# Patient Record
Sex: MALE | Race: WHITE | NOT HISPANIC OR LATINO | Employment: FULL TIME | URBAN - METROPOLITAN AREA
[De-identification: names, ages, dates, MRNs, and addresses within clinical notes are randomized per-mention and may not be internally consistent; named-entity substitution may affect disease eponyms.]

---

## 2017-01-24 ENCOUNTER — HOSPITAL ENCOUNTER (OUTPATIENT)
Dept: LAB | Facility: MEDICAL CENTER | Age: 32
End: 2017-01-24
Attending: FAMILY MEDICINE
Payer: COMMERCIAL

## 2017-01-24 LAB
ALBUMIN SERPL BCP-MCNC: 4.6 G/DL (ref 3.2–4.9)
ALBUMIN/GLOB SERPL: 1.7 G/DL
ALP SERPL-CCNC: 69 U/L (ref 30–99)
ALT SERPL-CCNC: 13 U/L (ref 2–50)
ANION GAP SERPL CALC-SCNC: 8 MMOL/L (ref 0–11.9)
AST SERPL-CCNC: 13 U/L (ref 12–45)
BASOPHILS # BLD AUTO: 0.05 K/UL (ref 0–0.12)
BASOPHILS NFR BLD AUTO: 0.7 % (ref 0–1.8)
BILIRUB SERPL-MCNC: 0.9 MG/DL (ref 0.1–1.5)
BUN SERPL-MCNC: 18 MG/DL (ref 8–22)
CALCIUM SERPL-MCNC: 9.4 MG/DL (ref 8.5–10.5)
CHLORIDE SERPL-SCNC: 103 MMOL/L (ref 96–112)
CHOLEST SERPL-MCNC: 155 MG/DL (ref 100–199)
CO2 SERPL-SCNC: 28 MMOL/L (ref 20–33)
CREAT SERPL-MCNC: 1 MG/DL (ref 0.5–1.4)
EOSINOPHIL # BLD: 0.18 K/UL (ref 0–0.51)
EOSINOPHIL NFR BLD AUTO: 2.7 % (ref 0–6.9)
ERYTHROCYTE [DISTWIDTH] IN BLOOD BY AUTOMATED COUNT: 39.3 FL (ref 35.9–50)
EST. AVERAGE GLUCOSE BLD GHB EST-MCNC: 117 MG/DL
GLOBULIN SER CALC-MCNC: 2.7 G/DL (ref 1.9–3.5)
GLUCOSE SERPL-MCNC: 108 MG/DL (ref 65–99)
HBA1C MFR BLD: 5.7 % (ref 0–5.6)
HCT VFR BLD AUTO: 46.4 % (ref 42–52)
HDLC SERPL-MCNC: 47 MG/DL
HGB BLD-MCNC: 15.5 G/DL (ref 14–18)
IMM GRANULOCYTES # BLD AUTO: 0.02 K/UL (ref 0–0.11)
IMM GRANULOCYTES NFR BLD AUTO: 0.3 % (ref 0–0.9)
LDLC SERPL CALC-MCNC: 85 MG/DL
LYMPHOCYTES # BLD: 2.26 K/UL (ref 1–4.8)
LYMPHOCYTES NFR BLD AUTO: 33.7 % (ref 22–41)
MCH RBC QN AUTO: 29.5 PG (ref 27–33)
MCHC RBC AUTO-ENTMCNC: 33.4 G/DL (ref 33.7–35.3)
MCV RBC AUTO: 88.2 FL (ref 81.4–97.8)
MONOCYTES # BLD: 0.5 K/UL (ref 0–0.85)
MONOCYTES NFR BLD AUTO: 7.5 % (ref 0–13.4)
NEUTROPHILS # BLD: 3.69 K/UL (ref 1.82–7.42)
NEUTROPHILS NFR BLD AUTO: 55.1 % (ref 44–72)
NRBC # BLD AUTO: 0 K/UL
NRBC BLD-RTO: 0 /100 WBC
PLATELET # BLD AUTO: 308 K/UL (ref 164–446)
PMV BLD AUTO: 10 FL (ref 9–12.9)
POTASSIUM SERPL-SCNC: 4 MMOL/L (ref 3.6–5.5)
PROT SERPL-MCNC: 7.3 G/DL (ref 6–8.2)
RBC # BLD AUTO: 5.26 M/UL (ref 4.7–6.1)
SODIUM SERPL-SCNC: 139 MMOL/L (ref 135–145)
TRIGL SERPL-MCNC: 113 MG/DL (ref 0–149)
WBC # BLD AUTO: 6.7 K/UL (ref 4.8–10.8)

## 2017-01-24 PROCEDURE — 80061 LIPID PANEL: CPT

## 2017-01-24 PROCEDURE — 83036 HEMOGLOBIN GLYCOSYLATED A1C: CPT

## 2017-01-24 PROCEDURE — 80053 COMPREHEN METABOLIC PANEL: CPT

## 2017-01-24 PROCEDURE — 85025 COMPLETE CBC W/AUTO DIFF WBC: CPT

## 2017-01-24 PROCEDURE — 36415 COLL VENOUS BLD VENIPUNCTURE: CPT

## 2017-03-27 RX ORDER — DEXTROAMPHETAMINE SACCHARATE, AMPHETAMINE ASPARTATE, DEXTROAMPHETAMINE SULFATE AND AMPHETAMINE SULFATE 2.5; 2.5; 2.5; 2.5 MG/1; MG/1; MG/1; MG/1
10 TABLET ORAL EVERY MORNING
Qty: 30 TAB | Refills: 0 | Status: SHIPPED | OUTPATIENT
Start: 2017-03-27 | End: 2017-07-25 | Stop reason: SDUPTHER

## 2017-03-27 RX ORDER — DEXTROAMPHETAMINE SACCHARATE, AMPHETAMINE ASPARTATE MONOHYDRATE, DEXTROAMPHETAMINE SULFATE AND AMPHETAMINE SULFATE 5; 5; 5; 5 MG/1; MG/1; MG/1; MG/1
20 CAPSULE, EXTENDED RELEASE ORAL EVERY MORNING
Qty: 30 CAP | Refills: 0 | Status: SHIPPED | OUTPATIENT
Start: 2017-03-27 | End: 2017-04-24 | Stop reason: SDUPTHER

## 2017-04-20 ENCOUNTER — APPOINTMENT (OUTPATIENT)
Dept: BEHAVIORAL HEALTH | Facility: PHYSICIAN GROUP | Age: 32
End: 2017-04-20
Payer: COMMERCIAL

## 2017-04-24 ENCOUNTER — OFFICE VISIT (OUTPATIENT)
Dept: BEHAVIORAL HEALTH | Facility: PHYSICIAN GROUP | Age: 32
End: 2017-04-24
Payer: COMMERCIAL

## 2017-04-24 VITALS
WEIGHT: 212 LBS | BODY MASS INDEX: 30.35 KG/M2 | HEART RATE: 90 BPM | DIASTOLIC BLOOD PRESSURE: 90 MMHG | HEIGHT: 70 IN | SYSTOLIC BLOOD PRESSURE: 127 MMHG

## 2017-04-24 DIAGNOSIS — F90.0 ADHD, PREDOMINANTLY INATTENTIVE TYPE: ICD-10-CM

## 2017-04-24 PROCEDURE — 99212 OFFICE O/P EST SF 10 MIN: CPT | Performed by: PSYCHIATRY & NEUROLOGY

## 2017-04-24 RX ORDER — DEXTROAMPHETAMINE SACCHARATE, AMPHETAMINE ASPARTATE MONOHYDRATE, DEXTROAMPHETAMINE SULFATE AND AMPHETAMINE SULFATE 5; 5; 5; 5 MG/1; MG/1; MG/1; MG/1
20 CAPSULE, EXTENDED RELEASE ORAL EVERY MORNING
Qty: 30 CAP | Refills: 0 | Status: SHIPPED | OUTPATIENT
Start: 2017-06-24 | End: 2017-07-25

## 2017-04-24 RX ORDER — DEXTROAMPHETAMINE SACCHARATE, AMPHETAMINE ASPARTATE MONOHYDRATE, DEXTROAMPHETAMINE SULFATE AND AMPHETAMINE SULFATE 5; 5; 5; 5 MG/1; MG/1; MG/1; MG/1
20 CAPSULE, EXTENDED RELEASE ORAL EVERY MORNING
Qty: 30 CAP | Refills: 0 | Status: SHIPPED | OUTPATIENT
Start: 2017-04-26 | End: 2017-07-25

## 2017-04-24 RX ORDER — DEXTROAMPHETAMINE SACCHARATE, AMPHETAMINE ASPARTATE MONOHYDRATE, DEXTROAMPHETAMINE SULFATE AND AMPHETAMINE SULFATE 5; 5; 5; 5 MG/1; MG/1; MG/1; MG/1
20 CAPSULE, EXTENDED RELEASE ORAL EVERY MORNING
Qty: 30 CAP | Refills: 0 | Status: SHIPPED | OUTPATIENT
Start: 2017-05-25 | End: 2017-07-25 | Stop reason: SDUPTHER

## 2017-04-24 NOTE — PROGRESS NOTES
PSYCHIATRY FOLLOW-UP NOTE      Chief Complaint   Patient presents with   • Follow-Up     ADHD         History Of Present Illness:  Stanislaw Delgado is a 31 y.o. old male with ADHD comes in today for follow up, was last seen 4 months ago. He has been doing good since his last visit here. Continues to be compliant with Adderall and is doing good in regards to his ADHD symptoms. Spring and summer are the busiest for him in terms of job and he is been having long days and a lot of traveling. He has noticed that every spring and summer he is using more of the IR Adderall that he uses in the [place]. He likes his current regimen and is not interested in increasing the dose of XL formulation to 30 mg as he is worried it might interfere with his sleep. Denies any side effects with Adderall on his sleep, anxiety, mood or appetite. Denies any recent stressors.    Social History:   Single currently, never , no kids. Parents and sister live in [place]. 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    Substance Use:  Alcohol - Social drinking with friends. Dale binge drinking.   Nicotine - Denies  Illicit drugs - Denies     Past Medication Trials:  None    Medications:  Current Outpatient Prescriptions   Medication Sig Dispense Refill   • Pseudoephedrine-Guaifenesin (MUCINEX D PO) Take  by mouth.     • amphetamine-dextroamphetamine (ADDERALL XR) 20 MG per XR capsule Take 1 Cap by mouth every morning. 30 Cap 0   • amphetamine-dextroamphetamine (ADDERALL, 10MG,) 10 MG Tab Take 1 Tab by mouth every morning. 30 Tab 0     No current facility-administered medications for this visit.       Review Of Systems:    Constitutional - Negative for fatigue  Respiratory - Negative for shortness of breath, cough  CVS - Negative for chest pain, palpitations  GI - Negative for nausea, vomiting, abdominal pain, diarrhea, constipation  Musculoskeletal - Negative for back pain  Neurological - Negative for headaches  Psychiatric -  "Negative for focus problems, depression, anxiety    Physical Examination:  Vital signs: /90 mmHg  Pulse 90  Ht 1.778 m (5' 10\")  Wt 96.163 kg (212 lb)  BMI 30.42 kg/m2    Musculoskeletal: Normal gait. No abnormal movements.     Mental Status Evaluation:   General: Young male, dressed in casual attire, good grooming and hygiene, in no apparent distress, calm and cooperative, good eye contact, no psychomotor agitation or retardation  Orientation: Alert and oriented to person, place and time  Recent and remote memory: Grossly intact  Attention span and concentration: Grossly intact  Speech: Spontaneous, normal rate, rhythm and tone  Thought Process: Linear, logical and goal directed  Thought Content: Denies suicidal or homicidal ideations, intent or plan  Perception: Denies auditory or visual hallucinations. No delusions noted  Associations: Intact  Language: Appropriate  Fund of knowledge and vocabulary: Grossly adequate  Mood: \"Good\"  Affect: Euthymic, mood congruent  Insight: Good  Judgment: Good      Impression:  1. ADHD, inattentive type    Medical Records/Labs/Diagnostic Tests Reviewed:  [facility] records -appropriate refills, no abuse suspected    Plan:  1. Continue Adderall XR 20 mg in the morning for ADHD. Provided 3 scripts for 30 tablets each.  2. Continue Adderall IR 10 mg daily as needed for ADHD. Not refilled today. It was refilled on 3/27/17 but he has not picked up the medication from the pharmacy.    Return to clinic in 3 months or sooner if symptoms worsen    The proposed treatment plan was discussed with the patient who was provided the opportunity to ask questions and make suggestions regarding alternative treatment. Patient verbalized understanding and expressed agreement with the plan.     Xochitl Marquez M.D.  04/24/2017    This note was created using voice recognition software (Dragon). The accuracy of the dictation is limited by the abilities of the software. I have reviewed the note " prior to signing, however some errors in grammar and context are still possible. If you have any questions related to this note please do not hesitate to contact our office.

## 2017-07-25 RX ORDER — DEXTROAMPHETAMINE SACCHARATE, AMPHETAMINE ASPARTATE MONOHYDRATE, DEXTROAMPHETAMINE SULFATE AND AMPHETAMINE SULFATE 5; 5; 5; 5 MG/1; MG/1; MG/1; MG/1
20 CAPSULE, EXTENDED RELEASE ORAL EVERY MORNING
Qty: 30 CAP | Refills: 0 | Status: SHIPPED | OUTPATIENT
Start: 2017-07-25 | End: 2017-08-23 | Stop reason: SDUPTHER

## 2017-07-25 RX ORDER — DEXTROAMPHETAMINE SACCHARATE, AMPHETAMINE ASPARTATE, DEXTROAMPHETAMINE SULFATE AND AMPHETAMINE SULFATE 2.5; 2.5; 2.5; 2.5 MG/1; MG/1; MG/1; MG/1
10 TABLET ORAL
Qty: 30 TAB | Refills: 0 | Status: SHIPPED | OUTPATIENT
Start: 2017-07-25 | End: 2017-08-23 | Stop reason: SDUPTHER

## 2017-08-23 ENCOUNTER — OFFICE VISIT (OUTPATIENT)
Dept: BEHAVIORAL HEALTH | Facility: PHYSICIAN GROUP | Age: 32
End: 2017-08-23
Payer: COMMERCIAL

## 2017-08-23 VITALS
HEIGHT: 70 IN | BODY MASS INDEX: 31.92 KG/M2 | HEART RATE: 92 BPM | DIASTOLIC BLOOD PRESSURE: 88 MMHG | WEIGHT: 223 LBS | SYSTOLIC BLOOD PRESSURE: 130 MMHG

## 2017-08-23 DIAGNOSIS — F90.0 ADHD, PREDOMINANTLY INATTENTIVE TYPE: ICD-10-CM

## 2017-08-23 PROCEDURE — 99213 OFFICE O/P EST LOW 20 MIN: CPT | Performed by: PSYCHIATRY & NEUROLOGY

## 2017-08-23 RX ORDER — DEXTROAMPHETAMINE SACCHARATE, AMPHETAMINE ASPARTATE MONOHYDRATE, DEXTROAMPHETAMINE SULFATE AND AMPHETAMINE SULFATE 5; 5; 5; 5 MG/1; MG/1; MG/1; MG/1
20 CAPSULE, EXTENDED RELEASE ORAL EVERY MORNING
Qty: 30 CAP | Refills: 0 | Status: SHIPPED | OUTPATIENT
Start: 2017-10-20 | End: 2017-12-01 | Stop reason: SDUPTHER

## 2017-08-23 RX ORDER — DEXTROAMPHETAMINE SACCHARATE, AMPHETAMINE ASPARTATE, DEXTROAMPHETAMINE SULFATE AND AMPHETAMINE SULFATE 2.5; 2.5; 2.5; 2.5 MG/1; MG/1; MG/1; MG/1
10 TABLET ORAL
Qty: 30 TAB | Refills: 0 | Status: SHIPPED | OUTPATIENT
Start: 2017-09-22 | End: 2017-12-01 | Stop reason: SDUPTHER

## 2017-08-23 RX ORDER — DEXTROAMPHETAMINE SACCHARATE, AMPHETAMINE ASPARTATE MONOHYDRATE, DEXTROAMPHETAMINE SULFATE AND AMPHETAMINE SULFATE 5; 5; 5; 5 MG/1; MG/1; MG/1; MG/1
20 CAPSULE, EXTENDED RELEASE ORAL EVERY MORNING
Qty: 30 CAP | Refills: 0 | Status: SHIPPED | OUTPATIENT
Start: 2017-08-24 | End: 2017-09-12 | Stop reason: SDUPTHER

## 2017-08-23 RX ORDER — DEXTROAMPHETAMINE SACCHARATE, AMPHETAMINE ASPARTATE, DEXTROAMPHETAMINE SULFATE AND AMPHETAMINE SULFATE 2.5; 2.5; 2.5; 2.5 MG/1; MG/1; MG/1; MG/1
10 TABLET ORAL
Qty: 30 TAB | Refills: 0 | Status: SHIPPED | OUTPATIENT
Start: 2017-08-24 | End: 2017-09-12 | Stop reason: SDUPTHER

## 2017-08-23 RX ORDER — DEXTROAMPHETAMINE SACCHARATE, AMPHETAMINE ASPARTATE MONOHYDRATE, DEXTROAMPHETAMINE SULFATE AND AMPHETAMINE SULFATE 5; 5; 5; 5 MG/1; MG/1; MG/1; MG/1
20 CAPSULE, EXTENDED RELEASE ORAL EVERY MORNING
Qty: 30 CAP | Refills: 0 | Status: SHIPPED | OUTPATIENT
Start: 2017-09-22 | End: 2017-12-01 | Stop reason: SDUPTHER

## 2017-08-23 NOTE — PROGRESS NOTES
PSYCHIATRY FOLLOW-UP NOTE      Chief Complaint   Patient presents with   • Follow-Up     ADHD         History Of Present Illness:  Stanislaw Delgado is a 32 y.o. old male with ADHD comes in today for follow up, was last seen 4 months ago. He reports doing good since his last visit here. He has been really busy the whole summer with his job. He has been traveling a lot and will be doing the same for another 2 months or so. He enjoys his job but has been thinking about moving to [facility] to a bigger school. He continues to be compliant with his Adderall and endorses benefit on his ADHD symptoms. He has been taking more of the IR Adderall because he has been having a lot of longer days with practice and traveling. He denies any side effects that he has noticed from Adderall on his appetite or sleep. Denies any current mood or anxiety symptoms. Denies having problems with irritability or agitation. Denies any recent impulsive or reckless behaviors. Denies any new stressors at this time.    Social History:   Single currently, never , no kids. Parents and sister live in [place]. 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    Substance Use:  Alcohol - Social drinking with friends.  Nicotine - Denies  Illicit drugs - Denies     Past Medication Trials:  None    Medications:  Current Outpatient Prescriptions   Medication Sig Dispense Refill   • [START ON 8/24/2017] amphetamine-dextroamphetamine (ADDERALL XR, 20MG,) 20 MG per XR capsule Take 1 Cap by mouth every morning. 30 Cap 0   • [START ON 9/22/2017] amphetamine-dextroamphetamine (ADDERALL XR) 20 MG per XR capsule Take 1 Cap by mouth every morning. 30 Cap 0   • [START ON 10/20/2017] amphetamine-dextroamphetamine (ADDERALL XR) 20 MG per XR capsule Take 1 Cap by mouth every morning. 30 Cap 0   • [START ON 8/24/2017] amphetamine-dextroamphetamine (ADDERALL, 10MG,) 10 MG Tab Take 1 Tab by mouth 1 time daily as needed (ADHD symptoms). 30 Tab 0   •  "[START ON 9/22/2017] amphetamine-dextroamphetamine (ADDERALL) 10 MG Tab Take 1 Tab by mouth 1 time daily as needed (ADHD symptoms). 30 Tab 0   • Pseudoephedrine-Guaifenesin (MUCINEX D PO) Take  by mouth.       No current facility-administered medications for this visit.       Review Of Systems:    Constitutional - Negative for fatigue  Respiratory - Negative for shortness of breath, cough  CVS - Negative for chest pain, palpitations  GI - Negative for nausea, vomiting, abdominal pain, diarrhea, constipation  Musculoskeletal - Negative for back pain  Neurological - Negative for headaches  Psychiatric - Negative for focus problems, depression, anxiety    Physical Examination:  Vital signs: /88 mmHg  Pulse 92  Ht 1.778 m (5' 10\")  Wt 101.152 kg (223 lb)  BMI 32.00 kg/m2    Musculoskeletal: Normal gait. No abnormal movements.     Mental Status Evaluation:   General: Young male, dressed in casual attire, good grooming and hygiene, in no apparent distress, calm and cooperative, good eye contact, no psychomotor agitation or retardation  Orientation: Alert and oriented to person, place and time  Recent and remote memory: Grossly intact  Attention span and concentration: Grossly intact  Speech: Spontaneous, normal rate, rhythm and tone  Thought Process: Linear, logical and goal directed  Thought Content: Denies suicidal or homicidal ideations, intent or plan  Perception: Denies auditory or visual hallucinations. No delusions noted  Associations: Intact  Language: Appropriate  Fund of knowledge and vocabulary: Grossly adequate  Mood: \"fine\"  Affect: Euthymic, mood congruent  Insight: Good  Judgment: Good      Impression:  1. ADHD, inattentive type    Medical Records/Labs/Diagnostic Tests Reviewed:  [place] [facility] records - appropriate refills, no abuse suspected    Plan:  1. Continue Adderall XR 20 mg in the morning for ADHD. Provided him with 3 prescriptions for 30 tablets each.  2. Continue Adderall IR 10 mg daily as " needed for ADHD. Provided him with 2 prescriptions for 30 tablets each.    Return to clinic in 3 months or sooner if symptoms worsen    Total face-to-face time: 20 minutes  More than 50% of face-to-face time was spent in counseling and coordinating care. Discussed coping skills and decision making.     The proposed treatment plan was discussed with the patient who was provided the opportunity to ask questions and make suggestions regarding alternative treatment. Patient verbalized understanding and expressed agreement with the plan.     Xochitl Marquez M.D.  08/23/2017    This note was created using voice recognition software (Dragon). The accuracy of the dictation is limited by the abilities of the software. I have reviewed the note prior to signing, however some errors in grammar and context are still possible. If you have any questions related to this note please do not hesitate to contact our office.

## 2017-09-12 RX ORDER — DEXTROAMPHETAMINE SACCHARATE, AMPHETAMINE ASPARTATE, DEXTROAMPHETAMINE SULFATE AND AMPHETAMINE SULFATE 2.5; 2.5; 2.5; 2.5 MG/1; MG/1; MG/1; MG/1
10 TABLET ORAL
Qty: 30 TAB | Refills: 0 | Status: SHIPPED | OUTPATIENT
Start: 2017-09-12 | End: 2017-12-01

## 2017-09-12 RX ORDER — DEXTROAMPHETAMINE SACCHARATE, AMPHETAMINE ASPARTATE MONOHYDRATE, DEXTROAMPHETAMINE SULFATE AND AMPHETAMINE SULFATE 5; 5; 5; 5 MG/1; MG/1; MG/1; MG/1
20 CAPSULE, EXTENDED RELEASE ORAL EVERY MORNING
Qty: 30 CAP | Refills: 0 | Status: SHIPPED | OUTPATIENT
Start: 2017-09-12 | End: 2017-12-01 | Stop reason: SDUPTHER

## 2017-09-12 NOTE — TELEPHONE ENCOUNTER
Favian dropped his Adderall XR and IR prescriptions from 2017 that he was unable to fill on time and have  now. I have refilled both of this medications and he will be picking up the new prescriptions from our office at his convenience.

## 2017-10-17 ENCOUNTER — DOCUMENTATION (OUTPATIENT)
Dept: BEHAVIORAL HEALTH | Facility: PHYSICIAN GROUP | Age: 32
End: 2017-10-17

## 2017-12-01 ENCOUNTER — OFFICE VISIT (OUTPATIENT)
Dept: BEHAVIORAL HEALTH | Facility: PHYSICIAN GROUP | Age: 32
End: 2017-12-01
Payer: COMMERCIAL

## 2017-12-01 VITALS
SYSTOLIC BLOOD PRESSURE: 132 MMHG | BODY MASS INDEX: 32.35 KG/M2 | DIASTOLIC BLOOD PRESSURE: 99 MMHG | WEIGHT: 226 LBS | HEART RATE: 95 BPM | HEIGHT: 70 IN

## 2017-12-01 DIAGNOSIS — F90.0 ADHD, PREDOMINANTLY INATTENTIVE TYPE: ICD-10-CM

## 2017-12-01 PROCEDURE — 99213 OFFICE O/P EST LOW 20 MIN: CPT | Performed by: PSYCHIATRY & NEUROLOGY

## 2017-12-01 RX ORDER — DEXTROAMPHETAMINE SACCHARATE, AMPHETAMINE ASPARTATE MONOHYDRATE, DEXTROAMPHETAMINE SULFATE AND AMPHETAMINE SULFATE 5; 5; 5; 5 MG/1; MG/1; MG/1; MG/1
20 CAPSULE, EXTENDED RELEASE ORAL EVERY MORNING
Qty: 30 CAP | Refills: 0 | Status: SHIPPED | OUTPATIENT
Start: 2017-12-30 | End: 2018-03-07

## 2017-12-01 RX ORDER — DEXTROAMPHETAMINE SACCHARATE, AMPHETAMINE ASPARTATE MONOHYDRATE, DEXTROAMPHETAMINE SULFATE AND AMPHETAMINE SULFATE 5; 5; 5; 5 MG/1; MG/1; MG/1; MG/1
20 CAPSULE, EXTENDED RELEASE ORAL EVERY MORNING
Qty: 30 CAP | Refills: 0 | Status: SHIPPED | OUTPATIENT
Start: 2017-12-01 | End: 2018-03-07 | Stop reason: SDUPTHER

## 2017-12-01 RX ORDER — DEXTROAMPHETAMINE SACCHARATE, AMPHETAMINE ASPARTATE MONOHYDRATE, DEXTROAMPHETAMINE SULFATE AND AMPHETAMINE SULFATE 5; 5; 5; 5 MG/1; MG/1; MG/1; MG/1
20 CAPSULE, EXTENDED RELEASE ORAL EVERY MORNING
Qty: 30 CAP | Refills: 0 | Status: SHIPPED | OUTPATIENT
Start: 2018-01-28 | End: 2018-03-07

## 2017-12-01 RX ORDER — DEXTROAMPHETAMINE SACCHARATE, AMPHETAMINE ASPARTATE, DEXTROAMPHETAMINE SULFATE AND AMPHETAMINE SULFATE 2.5; 2.5; 2.5; 2.5 MG/1; MG/1; MG/1; MG/1
10 TABLET ORAL
Qty: 30 TAB | Refills: 0 | Status: SHIPPED | OUTPATIENT
Start: 2017-12-01 | End: 2018-03-07 | Stop reason: SDUPTHER

## 2017-12-01 NOTE — PROGRESS NOTES
PSYCHIATRY FOLLOW-UP NOTE      Chief Complaint   Patient presents with   • Follow-Up     ADHD         History Of Present Illness:  Stanislaw Delgado is a 32 y.o. old male with ADHD comes in today for follow up, was last seen 3 months ago. He has been doing good since his last visit here. He ran out of his Adderall pills a week and a half ago and has been struggling with energy and lack of focus since then. He endorses benefit from the extended release Adderall when he is on it. He usually takes a major release Adderall only if he has something going on in the evening that requires his concentration. Denies any side effects from Adderall that he has noticed. Denies any mood or anxiety symptoms. Denies any impairments in his sleep or appetite. Denies any new psychosocial stressors.    Social History:   Single currently, never , no kids. Parents and sister live in [place]. 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    Substance Use:  Alcohol - Social drinking with friends.  Nicotine - Denies  Illicit drugs - Denies     Past Medication Trials:  None    Medications:  Current Outpatient Prescriptions   Medication Sig Dispense Refill   • amphetamine-dextroamphetamine (ADDERALL XR) 20 MG per XR capsule Take 1 Cap by mouth every morning. 30 Cap 0   • [START ON 12/30/2017] amphetamine-dextroamphetamine (ADDERALL XR) 20 MG per XR capsule Take 1 Cap by mouth every morning. 30 Cap 0   • [START ON 1/28/2018] amphetamine-dextroamphetamine (ADDERALL XR, 20MG,) 20 MG per XR capsule Take 1 Cap by mouth every morning. 30 Cap 0   • amphetamine-dextroamphetamine (ADDERALL) 10 MG Tab Take 1 Tab by mouth 1 time daily as needed (ADHD symptoms). 30 Tab 0     No current facility-administered medications for this visit.        Review Of Systems:    Constitutional - Negative for fatigue  Respiratory - Negative for shortness of breath, cough  CVS - Negative for chest pain, palpitations  GI - Negative for nausea, vomiting,  "abdominal pain, diarrhea, constipation  Musculoskeletal - Negative for back pain  Neurological - Negative for headaches  Psychiatric - Negative for focus problems, depression, anxiety    Physical Examination:  Vital signs: /99   Pulse 95   Ht 1.778 m (5' 10\")   Wt 102.5 kg (226 lb)   BMI 32.43 kg/m²     Musculoskeletal: Normal gait. No abnormal movements.     Mental Status Evaluation:   General: Young male, dressed in casual attire, good grooming and hygiene, in no apparent distress, calm and cooperative, good eye contact, no psychomotor agitation or retardation  Orientation: Alert and oriented to person, place and time  Recent and remote memory: Grossly intact  Attention span and concentration: Grossly intact  Speech: Spontaneous, normal rate, rhythm and tone  Thought Process: Linear, logical and goal directed  Thought Content: Denies suicidal or homicidal ideations, intent or plan  Perception: Denies auditory or visual hallucinations. No delusions noted  Associations: Intact  Language: Appropriate  Fund of knowledge and vocabulary: Grossly adequate  Mood: \"good\"  Affect: Euthymic, mood congruent  Insight: Good  Judgment: Good      Impression:  1. ADHD, inattentive type    Medical Records/Labs/Diagnostic Tests Reviewed:  [facility] records - appropriate refills, no abuse suspected    Plan:  1. Continue Adderall XR 20 mg in the morning for ADHD. Provided him with 3 prescriptions for 30 tablets each.  2. Continue Adderall IR 10 mg daily as needed for ADHD. Provided him with 1 prescription for 30 tablets each.    Return to clinic in 3 months or sooner if symptoms worsen    The proposed treatment plan was discussed with the patient who was provided the opportunity to ask questions and make suggestions regarding alternative treatment. Patient verbalized understanding and expressed agreement with the plan.     Xochitl Marquez M.D.  12/01/17    This note was created using voice recognition software (Dragon). The " accuracy of the dictation is limited by the abilities of the software. I have reviewed the note prior to signing, however some errors in grammar and context are still possible. If you have any questions related to this note please do not hesitate to contact our office.

## 2018-03-01 ENCOUNTER — APPOINTMENT (OUTPATIENT)
Dept: BEHAVIORAL HEALTH | Facility: PHYSICIAN GROUP | Age: 33
End: 2018-03-01
Payer: COMMERCIAL

## 2018-03-07 DIAGNOSIS — F90.0 ADHD, PREDOMINANTLY INATTENTIVE TYPE: ICD-10-CM

## 2018-03-07 RX ORDER — DEXTROAMPHETAMINE SACCHARATE, AMPHETAMINE ASPARTATE MONOHYDRATE, DEXTROAMPHETAMINE SULFATE AND AMPHETAMINE SULFATE 5; 5; 5; 5 MG/1; MG/1; MG/1; MG/1
20 CAPSULE, EXTENDED RELEASE ORAL EVERY MORNING
Qty: 30 CAP | Refills: 0 | Status: SHIPPED | OUTPATIENT
Start: 2018-03-07 | End: 2018-04-06

## 2018-03-07 RX ORDER — DEXTROAMPHETAMINE SACCHARATE, AMPHETAMINE ASPARTATE, DEXTROAMPHETAMINE SULFATE AND AMPHETAMINE SULFATE 2.5; 2.5; 2.5; 2.5 MG/1; MG/1; MG/1; MG/1
10 TABLET ORAL
Qty: 30 TAB | Refills: 0 | Status: SHIPPED | OUTPATIENT
Start: 2018-03-07 | End: 2018-04-06

## 2018-04-10 ENCOUNTER — OFFICE VISIT (OUTPATIENT)
Dept: BEHAVIORAL HEALTH | Facility: PHYSICIAN GROUP | Age: 33
End: 2018-04-10
Payer: COMMERCIAL

## 2018-04-10 VITALS
BODY MASS INDEX: 30.21 KG/M2 | HEIGHT: 70 IN | WEIGHT: 211 LBS | DIASTOLIC BLOOD PRESSURE: 81 MMHG | SYSTOLIC BLOOD PRESSURE: 131 MMHG | HEART RATE: 78 BPM

## 2018-04-10 DIAGNOSIS — F90.0 ADHD, PREDOMINANTLY INATTENTIVE TYPE: Primary | ICD-10-CM

## 2018-04-10 PROCEDURE — 99213 OFFICE O/P EST LOW 20 MIN: CPT | Performed by: PSYCHIATRY & NEUROLOGY

## 2018-04-10 RX ORDER — DEXTROAMPHETAMINE SACCHARATE, AMPHETAMINE ASPARTATE MONOHYDRATE, DEXTROAMPHETAMINE SULFATE AND AMPHETAMINE SULFATE 5; 5; 5; 5 MG/1; MG/1; MG/1; MG/1
20 CAPSULE, EXTENDED RELEASE ORAL EVERY MORNING
Qty: 30 CAP | Refills: 0 | Status: SHIPPED | OUTPATIENT
Start: 2018-05-09 | End: 2018-06-08

## 2018-04-10 RX ORDER — DEXTROAMPHETAMINE SACCHARATE, AMPHETAMINE ASPARTATE, DEXTROAMPHETAMINE SULFATE AND AMPHETAMINE SULFATE 2.5; 2.5; 2.5; 2.5 MG/1; MG/1; MG/1; MG/1
5 TABLET ORAL
Qty: 30 TAB | Refills: 0 | Status: SHIPPED | OUTPATIENT
Start: 2018-05-09 | End: 2018-04-10 | Stop reason: SDUPTHER

## 2018-04-10 RX ORDER — DEXTROAMPHETAMINE SACCHARATE, AMPHETAMINE ASPARTATE MONOHYDRATE, DEXTROAMPHETAMINE SULFATE AND AMPHETAMINE SULFATE 5; 5; 5; 5 MG/1; MG/1; MG/1; MG/1
20 CAPSULE, EXTENDED RELEASE ORAL EVERY MORNING
Qty: 30 CAP | Refills: 0 | Status: SHIPPED | OUTPATIENT
Start: 2018-06-07 | End: 2018-06-28 | Stop reason: SDUPTHER

## 2018-04-10 RX ORDER — DEXTROAMPHETAMINE SACCHARATE, AMPHETAMINE ASPARTATE, DEXTROAMPHETAMINE SULFATE AND AMPHETAMINE SULFATE 2.5; 2.5; 2.5; 2.5 MG/1; MG/1; MG/1; MG/1
10 TABLET ORAL
Qty: 30 TAB | Refills: 0 | Status: SHIPPED | OUTPATIENT
Start: 2018-05-09 | End: 2018-06-08

## 2018-04-10 RX ORDER — DEXTROAMPHETAMINE SACCHARATE, AMPHETAMINE ASPARTATE, DEXTROAMPHETAMINE SULFATE AND AMPHETAMINE SULFATE 2.5; 2.5; 2.5; 2.5 MG/1; MG/1; MG/1; MG/1
10 TABLET ORAL
Qty: 30 TAB | Refills: 0 | Status: SHIPPED | OUTPATIENT
Start: 2018-04-10 | End: 2018-05-10

## 2018-04-10 RX ORDER — DEXTROAMPHETAMINE SACCHARATE, AMPHETAMINE ASPARTATE MONOHYDRATE, DEXTROAMPHETAMINE SULFATE AND AMPHETAMINE SULFATE 5; 5; 5; 5 MG/1; MG/1; MG/1; MG/1
20 CAPSULE, EXTENDED RELEASE ORAL EVERY MORNING
Qty: 30 CAP | Refills: 0 | Status: SHIPPED | OUTPATIENT
Start: 2018-04-10 | End: 2018-05-10

## 2018-04-10 ASSESSMENT — PATIENT HEALTH QUESTIONNAIRE - PHQ9: CLINICAL INTERPRETATION OF PHQ2 SCORE: 0

## 2018-04-10 NOTE — PROGRESS NOTES
PSYCHIATRY FOLLOW-UP NOTE      Chief Complaint   Patient presents with   • Follow-Up     ADHD         History Of Present Illness:  Stanislaw Delgado is a 32 y.o. old male with ADHD comes in today for follow up, was last seen over 4 months ago. He reports doing good since his last visit with me. He is currently busy with his job as a  of [employer] team. Spring and summer months are the busiest for him which is when he travels a lot with his team. He tends to have longer days and needs his IR Adderall lot more to help with his concentration in the evening time. He has been compliant with the extended release formulation which he takes every morning and it helps him for most part of the day. He feels a lot more focused and organized with Adderall and is currently denying any uncontrolled ADHD symptoms. He has been taking care of himself and tends to sleep more when he is back home. Denies any recent reckless or impulsive behaviors. Denies any new psychosocial stressors. Denies any side effects from Adderall. Denies any current symptoms of depression or anxiety.    Social History:   He is currently single. He has never been never  and has no kids. Parents and sister live in [place]. 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    Substance Use:  Alcohol - Social drinking with friends. Denies recent binge drinking episodes or daily heavy drinking.  Nicotine - Denies  Illicit drugs - Denies     Past Medication Trials:  None    Medications:  Current Outpatient Prescriptions   Medication Sig Dispense Refill   • amphetamine-dextroamphetamine (ADDERALL XR) 20 MG per XR capsule Take 1 Cap by mouth every morning for 30 days. 30 Cap 0   • [START ON 5/9/2018] amphetamine-dextroamphetamine (ADDERALL XR) 20 MG per XR capsule Take 1 Cap by mouth every morning for 30 days. 30 Cap 0   • [START ON 6/7/2018] amphetamine-dextroamphetamine (ADDERALL XR) 20 MG per XR capsule Take 1 Cap by mouth every morning for 30  "days. 30 Cap 0   • amphetamine-dextroamphetamine (ADDERALL) 10 MG Tab Take 1 Tab by mouth 1 time daily as needed (ADHD symptoms) for up to 30 days. 30 Tab 0   • [START ON 5/9/2018] amphetamine-dextroamphetamine (ADDERALL) 10 MG Tab Take 1 Tab by mouth 1 time daily as needed (ADHD symptoms) for up to 30 days. 30 Tab 0     No current facility-administered medications for this visit.        Review Of Systems:    Constitutional - Negative for fatigue  Respiratory - Negative for shortness of breath, cough  CVS - Negative for chest pain, palpitations  GI - Negative for nausea, vomiting, abdominal pain, diarrhea, constipation  Musculoskeletal - Negative for back pain  Neurological - Negative for headaches  Psychiatric - Negative for focus problems, depression, anxiety    Physical Examination:  Vital signs: /81   Pulse 78   Ht 1.778 m (5' 10\")   Wt 95.7 kg (211 lb)   BMI 30.28 kg/m²     Musculoskeletal: Normal gait. No abnormal movements.     Mental Status Evaluation:   General: Young male, dressed in casual attire, good grooming and hygiene, in no apparent distress, calm and cooperative, good eye contact, no psychomotor agitation or retardation  Orientation: Alert and oriented to person, place and time  Recent and remote memory: Grossly intact  Attention span and concentration: Grossly intact  Speech: Spontaneous, normal rate, rhythm and tone  Thought Process: Linear, logical and goal directed  Thought Content: Denies suicidal or homicidal ideations, intent or plan  Perception: Denies auditory or visual hallucinations. No delusions noted  Associations: Intact  Language: Appropriate  Fund of knowledge and vocabulary: Grossly adequate  Mood: \"have been good\"  Affect: Euthymic, mood congruent  Insight: Good  Judgment: Good    Depression screening:  Depression Screen (PHQ-2/PHQ-9) 4/10/2018   PHQ-2 Total Score 0       Interpretation of PHQ-9 Total Score   Score Severity   1-4 No Depression   5-9 Mild Depression "   10-14 Moderate Depression   15-19 Moderately Severe Depression   20-27 Severe Depression    Medical Records/Labs/Diagnostic Tests Reviewed:  [place]  records - appropriate refills, no abuse suspected      Impression:  1. ADHD, inattentive type - stable    Plan:  1. Continue Adderall XR 20 mg in the morning for ADHD. Provided him with 3 prescriptions for 30 tablets each.  2. Continue Adderall IR 10 mg daily as needed for ADHD symptoms. Provided him with 2 prescription for 30 tablets each.    Return to clinic in 3 months or sooner if symptoms worsen    The proposed treatment plan was discussed with the patient who was provided the opportunity to ask questions and make suggestions regarding alternative treatment. Patient verbalized understanding and expressed agreement with the plan.     Xochitl Marquez M.D.  04/10/18    This note was created using voice recognition software (Dragon). The accuracy of the dictation is limited by the abilities of the software. I have reviewed the note prior to signing, however some errors in grammar and context are still possible. If you have any questions related to this note please do not hesitate to contact our office.

## 2018-06-28 ENCOUNTER — OFFICE VISIT (OUTPATIENT)
Dept: BEHAVIORAL HEALTH | Facility: PHYSICIAN GROUP | Age: 33
End: 2018-06-28
Payer: COMMERCIAL

## 2018-06-28 VITALS
SYSTOLIC BLOOD PRESSURE: 140 MMHG | WEIGHT: 215 LBS | HEIGHT: 70 IN | HEART RATE: 79 BPM | DIASTOLIC BLOOD PRESSURE: 95 MMHG | BODY MASS INDEX: 30.78 KG/M2

## 2018-06-28 DIAGNOSIS — F90.0 ADHD, PREDOMINANTLY INATTENTIVE TYPE: ICD-10-CM

## 2018-06-28 PROCEDURE — 99214 OFFICE O/P EST MOD 30 MIN: CPT | Performed by: PSYCHIATRY & NEUROLOGY

## 2018-06-28 RX ORDER — DEXTROAMPHETAMINE SACCHARATE, AMPHETAMINE ASPARTATE MONOHYDRATE, DEXTROAMPHETAMINE SULFATE AND AMPHETAMINE SULFATE 5; 5; 5; 5 MG/1; MG/1; MG/1; MG/1
20 CAPSULE, EXTENDED RELEASE ORAL 2 TIMES DAILY
Qty: 60 CAP | Refills: 0 | Status: SHIPPED | OUTPATIENT
Start: 2018-08-07 | End: 2018-09-06

## 2018-06-28 RX ORDER — DEXTROAMPHETAMINE SACCHARATE, AMPHETAMINE ASPARTATE, DEXTROAMPHETAMINE SULFATE AND AMPHETAMINE SULFATE 2.5; 2.5; 2.5; 2.5 MG/1; MG/1; MG/1; MG/1
10 TABLET ORAL
Qty: 30 TAB | Refills: 0 | Status: CANCELLED | OUTPATIENT
Start: 2018-06-28 | End: 2018-07-28

## 2018-06-28 RX ORDER — DEXTROAMPHETAMINE SACCHARATE, AMPHETAMINE ASPARTATE MONOHYDRATE, DEXTROAMPHETAMINE SULFATE AND AMPHETAMINE SULFATE 5; 5; 5; 5 MG/1; MG/1; MG/1; MG/1
20 CAPSULE, EXTENDED RELEASE ORAL 2 TIMES DAILY
Qty: 60 CAP | Refills: 0 | Status: SHIPPED | OUTPATIENT
Start: 2018-09-05 | End: 2018-10-02 | Stop reason: SDUPTHER

## 2018-06-28 RX ORDER — DEXTROAMPHETAMINE SACCHARATE, AMPHETAMINE ASPARTATE MONOHYDRATE, DEXTROAMPHETAMINE SULFATE AND AMPHETAMINE SULFATE 5; 5; 5; 5 MG/1; MG/1; MG/1; MG/1
20 CAPSULE, EXTENDED RELEASE ORAL 2 TIMES DAILY
Qty: 60 CAP | Refills: 0 | Status: SHIPPED | OUTPATIENT
Start: 2018-07-09 | End: 2018-08-08

## 2018-06-28 NOTE — PROGRESS NOTES
PSYCHIATRY FOLLOW-UP NOTE      Chief Complaint   Patient presents with   • Follow-Up     ADHD         History Of Present Illness:  Stanislaw Delgado is a 32 y.o. old male with ADHD comes in today for follow up, was last seen over 2 months ago.  He reports doing good since his last visit here.  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  He has noticed that during the spring and summer months where his team please most of the games he tends to struggle more with his concentration in the evenings.  He does take the immediate release Adderall but does not notice much benefit.  He has been compliant with the extended release formulation but feels that later in the afternoon he starts struggling with his focus and concentration.  Denies any side effects from his anxiety, mood, sleep or appetite.  Denies any psychosocial stressors.  Sleep and appetite are at baseline.  Denies having thoughts of wanting to hurt himself or anybody else    Social History:   He is currently single. He has never been never  and has no kids. Parents and sister live in [place]. 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    Substance Use:  Alcohol - Social drinking with friends. Denies recent binge drinking episodes or daily heavy drinking.  Nicotine - Denies  Illicit drugs - Denies     Past Medication Trials:  Adderall IR 10 mg     Medications:  Current Outpatient Prescriptions   Medication Sig Dispense Refill   • [START ON 7/9/2018] amphetamine-dextroamphetamine (ADDERALL XR) 20 MG per XR capsule Take 1 Cap by mouth 2 Times a Day for 30 days. 60 Cap 0   • [START ON 8/7/2018] amphetamine-dextroamphetamine (ADDERALL XR) 20 MG per XR capsule Take 1 Cap by mouth 2 Times a Day for 30 days. 60 Cap 0   • [START ON 9/5/2018] amphetamine-dextroamphetamine (ADDERALL XR) 20 MG per XR capsule Take 1 Cap by mouth 2 Times a Day for 30  "days. 60 Cap 0     No current facility-administered medications for this visit.        Review Of Systems:    Constitutional - Negative for fatigue  Respiratory - Negative for shortness of breath, cough  CVS - Negative for chest pain, palpitations  GI - Negative for nausea, vomiting, abdominal pain, diarrhea, constipation  Musculoskeletal - Negative for back pain  Neurological - Negative for headaches  Psychiatric - Negative for depression, anxiety, sleep problems. Positive for impaired concentration in the evenings    Physical Examination:  Vital signs: /95   Pulse 79   Ht 1.778 m (5' 10\")   Wt 97.5 kg (215 lb)   BMI 30.85 kg/m²     Musculoskeletal: Normal gait. No abnormal movements.     Mental Status Evaluation:   General: Young male, dressed in casual attire, good grooming and hygiene, in no apparent distress, calm and cooperative, good eye contact, no psychomotor agitation or retardation  Orientation: Alert and oriented to person, place and time  Recent and remote memory: Grossly intact  Attention span and concentration: Grossly intact  Speech: Spontaneous, normal rate, rhythm and tone  Thought Process: Linear, logical and goal directed  Thought Content: Denies suicidal or homicidal ideations, intent or plan  Perception: Denies auditory or visual hallucinations. No delusions noted  Associations: Intact  Language: Appropriate  Fund of knowledge and vocabulary: Grossly adequate  Mood: \"good\"  Affect: Euthymic, mood congruent  Insight: Good  Judgment: Good    Depression screening:  Depression Screen (PHQ-2/PHQ-9) 4/10/2018   PHQ-2 Total Score 0     Interpretation of PHQ-9 Total Score   Score Severity   1-4 No Depression   5-9 Mild Depression   10-14 Moderate Depression   15-19 Moderately Severe Depression   20-27 Severe Depression    Medical Records/Labs/Diagnostic Tests Reviewed:  [place]  records - appropriate refills, no abuse suspected      Impression:  1. ADHD, inattentive type - worsening    Plan:  1. "  Increase Adderall XR to 20 mg twice daily for ADHD.  Provided him with 3 prescriptions for 60 tabs each.  Informed him to take his second dose between noon and 2 PM to avoid insomnia.  We will temporarily increase it to twice daily and will discuss decreasing it back again to once daily dosing once his work schedule slows down and fall and winter months  2.  Discontinue Adderall IR    Return to clinic in 3 months or sooner if symptoms worsen    The proposed treatment plan was discussed with the patient who was provided the opportunity to ask questions and make suggestions regarding alternative treatment. Patient verbalized understanding and expressed agreement with the plan.     Total face-to-face time: 25 minutes  More than 50% of face-to-face time was spent in counseling and coordinating care. Discussed ADHD medications.     Xochitl Marquez M.D.  06/28/18    This note was created using voice recognition software (Dragon). The accuracy of the dictation is limited by the abilities of the software. I have reviewed the note prior to signing, however some errors in grammar and context are still possible. If you have any questions related to this note please do not hesitate to contact our office.

## 2018-10-02 ENCOUNTER — OFFICE VISIT (OUTPATIENT)
Dept: BEHAVIORAL HEALTH | Facility: CLINIC | Age: 33
End: 2018-10-02
Payer: COMMERCIAL

## 2018-10-02 VITALS — SYSTOLIC BLOOD PRESSURE: 127 MMHG | HEART RATE: 72 BPM | HEIGHT: 70 IN | DIASTOLIC BLOOD PRESSURE: 98 MMHG

## 2018-10-02 DIAGNOSIS — F90.0 ADHD, PREDOMINANTLY INATTENTIVE TYPE: ICD-10-CM

## 2018-10-02 PROCEDURE — 99213 OFFICE O/P EST LOW 20 MIN: CPT | Performed by: PSYCHIATRY & NEUROLOGY

## 2018-10-02 RX ORDER — DEXTROAMPHETAMINE SACCHARATE, AMPHETAMINE ASPARTATE MONOHYDRATE, DEXTROAMPHETAMINE SULFATE AND AMPHETAMINE SULFATE 5; 5; 5; 5 MG/1; MG/1; MG/1; MG/1
20 CAPSULE, EXTENDED RELEASE ORAL 2 TIMES DAILY
Qty: 60 CAP | Refills: 0 | Status: SHIPPED | OUTPATIENT
Start: 2018-10-31 | End: 2018-10-02 | Stop reason: SDUPTHER

## 2018-10-02 RX ORDER — DEXTROAMPHETAMINE SACCHARATE, AMPHETAMINE ASPARTATE MONOHYDRATE, DEXTROAMPHETAMINE SULFATE AND AMPHETAMINE SULFATE 5; 5; 5; 5 MG/1; MG/1; MG/1; MG/1
20 CAPSULE, EXTENDED RELEASE ORAL 2 TIMES DAILY
Qty: 60 CAP | Refills: 0 | Status: SHIPPED | OUTPATIENT
Start: 2018-10-31 | End: 2018-11-30

## 2018-10-02 RX ORDER — DEXTROAMPHETAMINE SACCHARATE, AMPHETAMINE ASPARTATE MONOHYDRATE, DEXTROAMPHETAMINE SULFATE AND AMPHETAMINE SULFATE 5; 5; 5; 5 MG/1; MG/1; MG/1; MG/1
20 CAPSULE, EXTENDED RELEASE ORAL 2 TIMES DAILY
Qty: 60 CAP | Refills: 0 | Status: SHIPPED | OUTPATIENT
Start: 2018-12-28 | End: 2019-01-27

## 2018-10-02 RX ORDER — DEXTROAMPHETAMINE SACCHARATE, AMPHETAMINE ASPARTATE MONOHYDRATE, DEXTROAMPHETAMINE SULFATE AND AMPHETAMINE SULFATE 5; 5; 5; 5 MG/1; MG/1; MG/1; MG/1
20 CAPSULE, EXTENDED RELEASE ORAL 2 TIMES DAILY
Qty: 60 CAP | Refills: 0 | Status: SHIPPED | OUTPATIENT
Start: 2018-11-29 | End: 2018-12-29

## 2018-10-02 NOTE — PROGRESS NOTES
PSYCHIATRY FOLLOW-UP NOTE      Chief Complaint   Patient presents with   • Follow-Up     ADHD         History Of Present Illness:  Stanislaw Delgado is a 33 y.o. old male with ADHD comes in today for follow up, was last seen 3 months ago. He reports doing better in regards to his ADHD with the change in Adderall formulation.  He has been taking it twice daily only on the days he needs the extra concentration later in the evenings.  He denies any side effects that he noticed with twice daily dosing on his sleep or appetite.  He denies having palpitations or any changes in his appetite or sleep.  He denies any current mood or anxiety symptoms.  He would like to keep the dose the same as it seems to be helping his ADHD.    Social History:   He is currently single. He has never been never  and has no kids. He is close with his parents and sister and they live in [place]. 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    Substance Use:  Alcohol - Social drinking with friends.   Nicotine - Denies  Illicit drugs - Denies     Past Medication Trials:  Adderall IR 10 mg (effective)    Medications:  Current Outpatient Prescriptions   Medication Sig Dispense Refill   • [START ON 11/29/2018] amphetamine-dextroamphetamine (ADDERALL XR) 20 MG per XR capsule Take 1 Cap by mouth 2 Times a Day for 30 days. 60 Cap 0   • [START ON 12/28/2018] amphetamine-dextroamphetamine (ADDERALL XR) 20 MG per XR capsule Take 1 Cap by mouth 2 Times a Day for 30 days. 60 Cap 0   • [START ON 10/31/2018] amphetamine-dextroamphetamine (ADDERALL XR) 20 MG per XR capsule Take 1 Cap by mouth 2 Times a Day for 30 days. 60 Cap 0     No current facility-administered medications for this visit.        Review Of Systems:    Constitutional - Negative for fatigue  Respiratory - Negative for shortness of breath, cough  CVS - Negative for chest pain, palpitations  GI - Negative for nausea, vomiting, abdominal pain, diarrhea,  "constipation  Musculoskeletal - Negative for back pain  Neurological - Negative for headaches  Psychiatric - Negative for depression, anxiety, sleep problems, impaired concentration     Physical Examination:  Vital signs: /98   Pulse 72   Ht 1.778 m (5' 10\")     Musculoskeletal: Normal gait. No abnormal movements.     Mental Status Evaluation:   General: Young male, dressed in casual attire, good grooming and hygiene, in no apparent distress, calm and cooperative, good eye contact, no psychomotor agitation or retardation  Orientation: Alert and oriented to person, place and time  Recent and remote memory: Grossly intact  Attention span and concentration: Grossly intact  Speech: Spontaneous, normal rate, rhythm and tone  Thought Process: Linear, logical and goal directed  Thought Content: Denies suicidal or homicidal ideations, intent or plan  Perception: Denies auditory or visual hallucinations. No delusions noted  Associations: Intact  Language: Appropriate  Fund of knowledge and vocabulary: Grossly adequate  Mood: \"good\"  Affect: Euthymic, mood congruent  Insight: Good  Judgment: Good    Depression screening:  Depression Screen (PHQ-2/PHQ-9) 4/10/2018   PHQ-2 Total Score 0     Interpretation of PHQ-9 Total Score   Score Severity   1-4 No Depression   5-9 Mild Depression   10-14 Moderate Depression   15-19 Moderately Severe Depression   20-27 Severe Depression    Medical Records/Labs/Diagnostic Tests Reviewed:  [place]  records - appropriate refills, no abuse suspected.  I called and spoke with his pharmacy to confirm his  dates.  He still has one prescription with him which he plans on turning in to the pharmacy today.      Impression:  1. ADHD, inattentive type - improved     Plan:  1.  Continue Adderall XR 20 mg twice daily for ADHD.  Provided him with 3 prescriptions for 60 tabs each.     Return to clinic in 4 months or sooner if symptoms worsen    The proposed treatment plan was discussed with the " patient who was provided the opportunity to ask questions and make suggestions regarding alternative treatment. Patient verbalized understanding and expressed agreement with the plan.      Xochitl Marquez M.D.  10/02/18    This note was created using voice recognition software (Dragon). The accuracy of the dictation is limited by the abilities of the software. I have reviewed the note prior to signing, however some errors in grammar and context are still possible. If you have any questions related to this note please do not hesitate to contact our office.

## 2019-03-04 DIAGNOSIS — F90.0 ADHD (ATTENTION DEFICIT HYPERACTIVITY DISORDER), INATTENTIVE TYPE: ICD-10-CM

## 2019-03-04 RX ORDER — DEXTROAMPHETAMINE SACCHARATE, AMPHETAMINE ASPARTATE MONOHYDRATE, DEXTROAMPHETAMINE SULFATE AND AMPHETAMINE SULFATE 5; 5; 5; 5 MG/1; MG/1; MG/1; MG/1
20 CAPSULE, EXTENDED RELEASE ORAL 2 TIMES DAILY
Qty: 60 CAP | Refills: 0 | Status: SHIPPED | OUTPATIENT
Start: 2019-03-04 | End: 2019-04-03

## 2019-03-04 RX ORDER — DEXTROAMPHETAMINE SACCHARATE, AMPHETAMINE ASPARTATE MONOHYDRATE, DEXTROAMPHETAMINE SULFATE AND AMPHETAMINE SULFATE 5; 5; 5; 5 MG/1; MG/1; MG/1; MG/1
20 CAPSULE, EXTENDED RELEASE ORAL 2 TIMES DAILY
Qty: 60 CAP | Refills: 0 | Status: SHIPPED | OUTPATIENT
Start: 2019-04-02 | End: 2019-05-02

## 2019-05-01 ENCOUNTER — APPOINTMENT (OUTPATIENT)
Dept: BEHAVIORAL HEALTH | Facility: CLINIC | Age: 34
End: 2019-05-01
Payer: COMMERCIAL

## 2019-05-16 ENCOUNTER — OFFICE VISIT (OUTPATIENT)
Dept: BEHAVIORAL HEALTH | Facility: CLINIC | Age: 34
End: 2019-05-16
Payer: COMMERCIAL

## 2019-05-16 VITALS
WEIGHT: 215 LBS | SYSTOLIC BLOOD PRESSURE: 127 MMHG | DIASTOLIC BLOOD PRESSURE: 88 MMHG | BODY MASS INDEX: 30.78 KG/M2 | HEIGHT: 70 IN | HEART RATE: 96 BPM

## 2019-05-16 DIAGNOSIS — F90.0 ADHD, PREDOMINANTLY INATTENTIVE TYPE: ICD-10-CM

## 2019-05-16 PROCEDURE — 99213 OFFICE O/P EST LOW 20 MIN: CPT | Performed by: PSYCHIATRY & NEUROLOGY

## 2019-05-16 RX ORDER — DEXTROAMPHETAMINE SULFATE, DEXTROAMPHETAMINE SACCHARATE, AMPHETAMINE SULFATE AND AMPHETAMINE ASPARTATE 5; 5; 5; 5 MG/1; MG/1; MG/1; MG/1
20 CAPSULE, EXTENDED RELEASE ORAL 2 TIMES DAILY
Qty: 60 CAP | Refills: 0 | Status: SHIPPED | OUTPATIENT
Start: 2019-07-13 | End: 2019-08-12

## 2019-05-16 RX ORDER — DEXTROAMPHETAMINE SULFATE, DEXTROAMPHETAMINE SACCHARATE, AMPHETAMINE SULFATE AND AMPHETAMINE ASPARTATE 5; 5; 5; 5 MG/1; MG/1; MG/1; MG/1
20 CAPSULE, EXTENDED RELEASE ORAL 2 TIMES DAILY
Qty: 60 CAP | Refills: 0 | Status: SHIPPED | OUTPATIENT
Start: 2019-05-16 | End: 2019-06-15

## 2019-05-16 RX ORDER — DEXTROAMPHETAMINE SULFATE, DEXTROAMPHETAMINE SACCHARATE, AMPHETAMINE SULFATE AND AMPHETAMINE ASPARTATE 5; 5; 5; 5 MG/1; MG/1; MG/1; MG/1
20 CAPSULE, EXTENDED RELEASE ORAL 2 TIMES DAILY
Qty: 60 CAP | Refills: 0 | Status: SHIPPED | OUTPATIENT
Start: 2019-06-14 | End: 2019-07-14

## 2019-05-16 NOTE — PROGRESS NOTES
PSYCHIATRY FOLLOW-UP NOTE      Chief Complaint   Patient presents with   • Follow-Up     ADHD         History Of Present Illness:  Stanislaw Delgado is a 33 y.o. old male with ADHD comes in today for follow up, was last seen 7 months ago.  He has been doing good in regards to his ADHD.  He picked up 2 prescriptions of Adderall in March but misplaced 1 of those prescriptions.  He recently found a prescription but it is  as he did not give it to the pharmacy.  He has been taking his Adderall once daily instead of twice to avoid running out of the medication.  He has been really busy at his job as an .  He is having some situational stress as he has to return to [employer] and has to move out of his condo.  He is also thinking about his future and possible move to [facility].  He is endorsing situational anxiety but denies any current mood symptoms.  Sleep and appetite have been good.  Denies any side effects from Adderall.     Social History:   He is currently single. He has never been never  and has no kids. He is close with his parents and sister and they all live in [place]. 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    Substance Use:  Alcohol - Social drinking with friends.   Nicotine - Denies  Illicit drugs - Denies     Past Medication Trials:  Adderall IR 10 mg (effective)    Medications:  Current Outpatient Prescriptions   Medication Sig Dispense Refill   • ADDERALL XR, 20MG, 20 MG per XR capsule Take 1 Cap by mouth 2 Times a Day for 30 days. 60 Cap 0   • [START ON 2019] ADDERALL XR, 20MG, 20 MG per XR capsule Take 1 Cap by mouth 2 Times a Day for 30 days. 60 Cap 0   • [START ON 2019] ADDERALL XR, 20MG, 20 MG per XR capsule Take 1 Cap by mouth 2 Times a Day for 30 days. 60 Cap 0     No current facility-administered medications for this visit.        Review Of Systems:    Constitutional - Negative for fatigue  Respiratory - Negative for shortness of breath,  "cough  CVS - Negative for chest pain, palpitations  GI - Negative for nausea, vomiting, abdominal pain, diarrhea, constipation  Musculoskeletal - Negative for back pain  Neurological - Negative for headaches  Psychiatric - Negative for depression, sleep problems, impaired concentration.  Positive for situational anxiety    Physical Examination:  Vital signs: /88   Pulse 96   Ht 1.778 m (5' 10\")   Wt 97.5 kg (215 lb)   BMI 30.85 kg/m²     Musculoskeletal: Normal gait. No abnormal movements.     Mental Status Evaluation:   General: Young male, dressed in casual attire, good grooming and hygiene, in no apparent distress, calm and cooperative, good eye contact, no psychomotor agitation or retardation  Orientation: Alert and oriented to person, place and time  Recent and remote memory: Grossly intact  Attention span and concentration: Grossly intact  Speech: Spontaneous, normal rate, rhythm and tone  Thought Process: Linear, logical and goal directed  Thought Content: Denies suicidal or homicidal ideations, intent or plan  Perception: Denies auditory or visual hallucinations. No delusions noted  Associations: Intact  Language: Appropriate  Fund of knowledge and vocabulary: Grossly adequate  Mood: \"doing well\"  Affect: Euthymic, mood congruent  Insight: Good  Judgment: Good    Depression screening:  Depression Screen (PHQ-2/PHQ-9) 4/10/2018   PHQ-2 Total Score 0     Interpretation of PHQ-9 Total Score   Score Severity   1-4 No Depression   5-9 Mild Depression   10-14 Moderate Depression   15-19 Moderately Severe Depression   20-27 Severe Depression    Medical Records/Labs/Diagnostic Tests Reviewed:  [place] [facility] records - appropriate refills as reported by patient, no abuse suspected.      Impression:  1. ADHD, inattentive type - stable    Plan:  1.  Continue Adderall XR 20 mg twice daily for ADHD.  Provided him with 3 prescriptions for 60 tabs each.  I have asked him to discard the prescription written in March as it " is  now.    Return to clinic in 3 months or sooner if symptoms worsen    The proposed treatment plan was discussed with the patient who was provided the opportunity to ask questions and make suggestions regarding alternative treatment. Patient verbalized understanding and expressed agreement with the plan.      Xochitl Marquez M.D.  19    This note was created using voice recognition software (Dragon). The accuracy of the dictation is limited by the abilities of the software. I have reviewed the note prior to signing, however some errors in grammar and context are still possible. If you have any questions related to this note please do not hesitate to contact our office.

## 2019-08-15 DIAGNOSIS — F90.2 ATTENTION DEFICIT HYPERACTIVITY DISORDER (ADHD), COMBINED TYPE: ICD-10-CM

## 2019-08-15 RX ORDER — DEXTROAMPHETAMINE SULFATE, DEXTROAMPHETAMINE SACCHARATE, AMPHETAMINE SULFATE AND AMPHETAMINE ASPARTATE 5; 5; 5; 5 MG/1; MG/1; MG/1; MG/1
20 CAPSULE, EXTENDED RELEASE ORAL 2 TIMES DAILY
Qty: 60 CAP | Refills: 0 | Status: SHIPPED | OUTPATIENT
Start: 2019-08-15 | End: 2019-09-14

## 2019-08-15 RX ORDER — DEXTROAMPHETAMINE SACCHARATE, AMPHETAMINE ASPARTATE MONOHYDRATE, DEXTROAMPHETAMINE SULFATE AND AMPHETAMINE SULFATE 5; 5; 5; 5 MG/1; MG/1; MG/1; MG/1
20 CAPSULE, EXTENDED RELEASE ORAL EVERY MORNING
Qty: 30 CAP | Refills: 0 | OUTPATIENT
Start: 2019-08-15